# Patient Record
Sex: FEMALE | Race: WHITE
[De-identification: names, ages, dates, MRNs, and addresses within clinical notes are randomized per-mention and may not be internally consistent; named-entity substitution may affect disease eponyms.]

---

## 2020-06-14 ENCOUNTER — HOSPITAL ENCOUNTER (EMERGENCY)
Dept: HOSPITAL 94 - ER | Age: 70
LOS: 1 days | Discharge: HOME | End: 2020-06-15
Payer: COMMERCIAL

## 2020-06-14 VITALS — WEIGHT: 293 LBS | HEIGHT: 64 IN | BODY MASS INDEX: 50.02 KG/M2

## 2020-06-14 DIAGNOSIS — Z88.8: ICD-10-CM

## 2020-06-14 DIAGNOSIS — G89.29: ICD-10-CM

## 2020-06-14 DIAGNOSIS — Z88.0: ICD-10-CM

## 2020-06-14 DIAGNOSIS — Z88.5: ICD-10-CM

## 2020-06-14 DIAGNOSIS — M54.9: Primary | ICD-10-CM

## 2020-06-14 DIAGNOSIS — F41.9: ICD-10-CM

## 2020-06-14 DIAGNOSIS — R07.89: ICD-10-CM

## 2020-06-14 DIAGNOSIS — I10: ICD-10-CM

## 2020-06-14 DIAGNOSIS — Z79.899: ICD-10-CM

## 2020-06-14 LAB
ALBUMIN SERPL BCP-MCNC: 3 G/DL (ref 3.4–5)
ALBUMIN/GLOB SERPL: 0.8 {RATIO} (ref 1.1–1.5)
ALP SERPL-CCNC: 79 IU/L (ref 46–116)
ALT SERPL W P-5'-P-CCNC: 22 U/L (ref 12–78)
ANION GAP SERPL CALCULATED.3IONS-SCNC: 4 MMOL/L (ref 8–16)
AST SERPL W P-5'-P-CCNC: 13 U/L (ref 10–37)
BASOPHILS # BLD AUTO: 0.1 X10'3 (ref 0–0.2)
BASOPHILS NFR BLD AUTO: 1 % (ref 0–1)
BILIRUB SERPL-MCNC: 0.1 MG/DL (ref 0.1–1)
BUN SERPL-MCNC: 12 MG/DL (ref 7–18)
BUN/CREAT SERPL: 10.2 (ref 6.6–38)
CALCIUM SERPL-MCNC: 8.6 MG/DL (ref 8.5–10.1)
CHLORIDE SERPL-SCNC: 109 MMOL/L (ref 99–107)
CO2 SERPL-SCNC: 28.8 MMOL/L (ref 24–32)
CREAT SERPL-MCNC: 1.18 MG/DL (ref 0.4–0.9)
EOSINOPHIL # BLD AUTO: 0.4 X10'3 (ref 0–0.9)
EOSINOPHIL NFR BLD AUTO: 3.6 % (ref 0–6)
ERYTHROCYTE [DISTWIDTH] IN BLOOD BY AUTOMATED COUNT: 13.6 % (ref 11.5–14.5)
GFR SERPL CREATININE-BSD FRML MDRD: 45 ML/MIN
GLUCOSE SERPL-MCNC: 141 MG/DL (ref 70–104)
HCT VFR BLD AUTO: 41 % (ref 35–45)
HGB BLD-MCNC: 13.6 G/DL (ref 12–16)
LIPASE SERPL-CCNC: 69 U/L (ref 73–393)
LYMPHOCYTES # BLD AUTO: 2.9 X10'3 (ref 1.1–4.8)
LYMPHOCYTES NFR BLD AUTO: 29.5 % (ref 21–51)
MCH RBC QN AUTO: 31.1 PG (ref 27–31)
MCHC RBC AUTO-ENTMCNC: 33.1 G/DL (ref 33–36.5)
MCV RBC AUTO: 94 FL (ref 78–98)
MONOCYTES # BLD AUTO: 0.8 X10'3 (ref 0–0.9)
MONOCYTES NFR BLD AUTO: 8.5 % (ref 2–12)
NEUTROPHILS # BLD AUTO: 5.6 X10'3 (ref 1.8–7.7)
NEUTROPHILS NFR BLD AUTO: 57.4 % (ref 42–75)
PLATELET # BLD AUTO: 253 X10'3 (ref 140–440)
PMV BLD AUTO: 8.2 FL (ref 7.4–10.4)
POTASSIUM SERPL-SCNC: 4.4 MMOL/L (ref 3.5–5.1)
PROT SERPL-MCNC: 7 G/DL (ref 6.4–8.2)
RBC # BLD AUTO: 4.36 X10'6 (ref 4.2–5.6)
SODIUM SERPL-SCNC: 142 MMOL/L (ref 135–145)
TROPONIN I SERPL-MCNC: < 0.04 NG/ML (ref 0–0.05)
WBC # BLD AUTO: 9.8 X10'3 (ref 4.5–11)

## 2020-06-14 PROCEDURE — 36415 COLL VENOUS BLD VENIPUNCTURE: CPT

## 2020-06-14 PROCEDURE — 80053 COMPREHEN METABOLIC PANEL: CPT

## 2020-06-14 PROCEDURE — 96374 THER/PROPH/DIAG INJ IV PUSH: CPT

## 2020-06-14 PROCEDURE — 84484 ASSAY OF TROPONIN QUANT: CPT

## 2020-06-14 PROCEDURE — 96375 TX/PRO/DX INJ NEW DRUG ADDON: CPT

## 2020-06-14 PROCEDURE — 93005 ELECTROCARDIOGRAM TRACING: CPT

## 2020-06-14 PROCEDURE — 96372 THER/PROPH/DIAG INJ SC/IM: CPT

## 2020-06-14 PROCEDURE — 85025 COMPLETE CBC W/AUTO DIFF WBC: CPT

## 2020-06-14 PROCEDURE — 74176 CT ABD & PELVIS W/O CONTRAST: CPT

## 2020-06-14 PROCEDURE — 83690 ASSAY OF LIPASE: CPT

## 2020-06-14 PROCEDURE — 99285 EMERGENCY DEPT VISIT HI MDM: CPT

## 2020-06-15 VITALS — SYSTOLIC BLOOD PRESSURE: 151 MMHG | DIASTOLIC BLOOD PRESSURE: 92 MMHG

## 2020-06-15 NOTE — NUR
More attempts made to reach "Saul" with no success.  Pt. apparently lives with 
this friend and he has the house key.  Pt. is also unable to ambulate any more 
than a few steps.  She uses a walker at home. Pt. has no other help because her 
sister is currently hospitalized also.

## 2021-07-04 ENCOUNTER — HOSPITAL ENCOUNTER (EMERGENCY)
Dept: HOSPITAL 94 - ER | Age: 71
LOS: 1 days | Discharge: HOME | End: 2021-07-05
Payer: COMMERCIAL

## 2021-07-04 VITALS — WEIGHT: 293 LBS | HEIGHT: 64 IN | BODY MASS INDEX: 50.02 KG/M2

## 2021-07-04 VITALS — SYSTOLIC BLOOD PRESSURE: 156 MMHG | DIASTOLIC BLOOD PRESSURE: 65 MMHG

## 2021-07-04 DIAGNOSIS — Z88.5: ICD-10-CM

## 2021-07-04 DIAGNOSIS — L84: Primary | ICD-10-CM

## 2021-07-04 DIAGNOSIS — Z88.1: ICD-10-CM

## 2021-07-04 DIAGNOSIS — G89.29: ICD-10-CM

## 2021-07-04 DIAGNOSIS — Z88.0: ICD-10-CM

## 2021-07-04 DIAGNOSIS — Z79.899: ICD-10-CM

## 2021-07-04 DIAGNOSIS — F41.9: ICD-10-CM

## 2021-07-04 DIAGNOSIS — I10: ICD-10-CM

## 2021-07-04 PROCEDURE — 99283 EMERGENCY DEPT VISIT LOW MDM: CPT
